# Patient Record
Sex: MALE | Race: WHITE | Employment: FULL TIME | URBAN - METROPOLITAN AREA
[De-identification: names, ages, dates, MRNs, and addresses within clinical notes are randomized per-mention and may not be internally consistent; named-entity substitution may affect disease eponyms.]

---

## 2018-10-31 ENCOUNTER — AMBULATORY - HEALTHEAST (OUTPATIENT)
Dept: FAMILY MEDICINE | Facility: CLINIC | Age: 38
End: 2018-10-31

## 2018-11-02 ENCOUNTER — OFFICE VISIT - HEALTHEAST (OUTPATIENT)
Dept: FAMILY MEDICINE | Facility: CLINIC | Age: 38
End: 2018-11-02

## 2018-11-02 ENCOUNTER — COMMUNICATION - HEALTHEAST (OUTPATIENT)
Dept: FAMILY MEDICINE | Facility: CLINIC | Age: 38
End: 2018-11-02

## 2018-11-02 DIAGNOSIS — Z00.00 HEALTHCARE MAINTENANCE: ICD-10-CM

## 2018-11-02 DIAGNOSIS — E66.813 CLASS 3 SEVERE OBESITY WITHOUT SERIOUS COMORBIDITY WITH BODY MASS INDEX (BMI) OF 40.0 TO 44.9 IN ADULT, UNSPECIFIED OBESITY TYPE (H): ICD-10-CM

## 2018-11-02 DIAGNOSIS — F43.10 PTSD (POST-TRAUMATIC STRESS DISORDER): ICD-10-CM

## 2018-11-02 DIAGNOSIS — E66.01 CLASS 3 SEVERE OBESITY WITHOUT SERIOUS COMORBIDITY WITH BODY MASS INDEX (BMI) OF 40.0 TO 44.9 IN ADULT, UNSPECIFIED OBESITY TYPE (H): ICD-10-CM

## 2018-11-02 DIAGNOSIS — F41.9 ANXIETY: ICD-10-CM

## 2018-11-02 DIAGNOSIS — G47.30 SLEEP APNEA: ICD-10-CM

## 2018-11-02 DIAGNOSIS — M25.551 HIP PAIN, RIGHT: ICD-10-CM

## 2018-11-02 DIAGNOSIS — Z72.0 TOBACCO ABUSE: ICD-10-CM

## 2018-11-02 ASSESSMENT — MIFFLIN-ST. JEOR: SCORE: 2297.92

## 2019-03-18 ENCOUNTER — OFFICE VISIT - HEALTHEAST (OUTPATIENT)
Dept: SLEEP MEDICINE | Facility: CLINIC | Age: 39
End: 2019-03-18

## 2019-03-18 DIAGNOSIS — R06.83 SNORING: ICD-10-CM

## 2019-03-18 DIAGNOSIS — E66.01 MORBID OBESITY (H): ICD-10-CM

## 2019-03-18 DIAGNOSIS — G47.10 HYPERSOMNIA: ICD-10-CM

## 2019-03-18 DIAGNOSIS — R29.818 SUSPECTED SLEEP APNEA: ICD-10-CM

## 2019-03-18 ASSESSMENT — MIFFLIN-ST. JEOR: SCORE: 2297.92

## 2019-04-03 ENCOUNTER — RECORDS - HEALTHEAST (OUTPATIENT)
Dept: ADMINISTRATIVE | Facility: OTHER | Age: 39
End: 2019-04-03

## 2019-04-09 ENCOUNTER — OFFICE VISIT - HEALTHEAST (OUTPATIENT)
Dept: BEHAVIORAL HEALTH | Facility: HOSPITAL | Age: 39
End: 2019-04-09

## 2019-04-09 DIAGNOSIS — F43.10 PTSD (POST-TRAUMATIC STRESS DISORDER): ICD-10-CM

## 2019-04-10 ENCOUNTER — COMMUNICATION - HEALTHEAST (OUTPATIENT)
Dept: BEHAVIORAL HEALTH | Facility: CLINIC | Age: 39
End: 2019-04-10

## 2019-04-23 ENCOUNTER — OFFICE VISIT - HEALTHEAST (OUTPATIENT)
Dept: BEHAVIORAL HEALTH | Facility: HOSPITAL | Age: 39
End: 2019-04-23

## 2019-04-23 DIAGNOSIS — F43.10 PTSD (POST-TRAUMATIC STRESS DISORDER): ICD-10-CM

## 2019-04-30 ENCOUNTER — OFFICE VISIT - HEALTHEAST (OUTPATIENT)
Dept: BEHAVIORAL HEALTH | Facility: HOSPITAL | Age: 39
End: 2019-04-30

## 2019-04-30 DIAGNOSIS — F43.10 PTSD (POST-TRAUMATIC STRESS DISORDER): ICD-10-CM

## 2019-05-07 ENCOUNTER — OFFICE VISIT - HEALTHEAST (OUTPATIENT)
Dept: BEHAVIORAL HEALTH | Facility: HOSPITAL | Age: 39
End: 2019-05-07

## 2019-05-07 ENCOUNTER — AMBULATORY - HEALTHEAST (OUTPATIENT)
Dept: BEHAVIORAL HEALTH | Facility: CLINIC | Age: 39
End: 2019-05-07

## 2019-05-07 DIAGNOSIS — F33.1 MAJOR DEPRESSIVE DISORDER, RECURRENT EPISODE, MODERATE (H): ICD-10-CM

## 2019-05-07 DIAGNOSIS — F43.10 PTSD (POST-TRAUMATIC STRESS DISORDER): ICD-10-CM

## 2019-05-07 DIAGNOSIS — F41.1 GAD (GENERALIZED ANXIETY DISORDER): ICD-10-CM

## 2019-05-13 ENCOUNTER — OFFICE VISIT - HEALTHEAST (OUTPATIENT)
Dept: BEHAVIORAL HEALTH | Facility: CLINIC | Age: 39
End: 2019-05-13

## 2019-05-13 DIAGNOSIS — F43.10 PTSD (POST-TRAUMATIC STRESS DISORDER): ICD-10-CM

## 2019-05-13 ASSESSMENT — MIFFLIN-ST. JEOR: SCORE: 2270.71

## 2019-07-08 ENCOUNTER — COMMUNICATION - HEALTHEAST (OUTPATIENT)
Dept: BEHAVIORAL HEALTH | Facility: CLINIC | Age: 39
End: 2019-07-08

## 2019-12-28 ENCOUNTER — COMMUNICATION - HEALTHEAST (OUTPATIENT)
Dept: BEHAVIORAL HEALTH | Facility: CLINIC | Age: 39
End: 2019-12-28

## 2019-12-28 DIAGNOSIS — F43.10 PTSD (POST-TRAUMATIC STRESS DISORDER): ICD-10-CM

## 2020-01-22 ENCOUNTER — COMMUNICATION - HEALTHEAST (OUTPATIENT)
Dept: BEHAVIORAL HEALTH | Facility: CLINIC | Age: 40
End: 2020-01-22

## 2020-01-22 DIAGNOSIS — F43.10 PTSD (POST-TRAUMATIC STRESS DISORDER): ICD-10-CM

## 2020-03-11 ENCOUNTER — HEALTH MAINTENANCE LETTER (OUTPATIENT)
Age: 40
End: 2020-03-11

## 2020-03-16 ENCOUNTER — NURSE TRIAGE (OUTPATIENT)
Dept: NURSING | Facility: CLINIC | Age: 40
End: 2020-03-16

## 2020-03-17 NOTE — TELEPHONE ENCOUNTER
Wife completed oncare today and was advised to be tested for covid. Wife was tested today - patient was at employer and was in 2 different homes today - is in many people's homes. Per protocol, wife was informed that her entire family should be self-quarantined until the results come back. Patient informed [employer] of this - his supervisor said he needs to report to work until test comes back negative. Gave patient the [facility] phone number and advised he have his supervisor discuss this with the department of health.    Afshan Mosher RN on 3/16/2020 at 8:18 PM    Reason for Disposition    Health Information question, no triage required and triager able to answer question    Additional Information    Negative: [1] Caller is not with the adult (patient) AND [2] reporting urgent symptoms    Negative: Lab result questions    Negative: Medication questions    Negative: Caller can't be reached by phone    Negative: Caller has already spoken to PCP or another triager    Negative: RN needs further essential information from caller in order to complete triage    Negative: Requesting regular office appointment    Negative: [1] Caller requesting NON-URGENT health information AND [2] PCP's office is the best resource    Protocols used: INFORMATION ONLY CALL-A-

## 2020-10-05 ENCOUNTER — TRANSFERRED RECORDS (OUTPATIENT)
Dept: HEALTH INFORMATION MANAGEMENT | Facility: CLINIC | Age: 40
End: 2020-10-05

## 2020-11-05 ENCOUNTER — TELEPHONE (OUTPATIENT)
Dept: ORTHOPEDICS | Facility: OTHER | Age: 40
End: 2020-11-05

## 2020-11-05 NOTE — TELEPHONE ENCOUNTER
Reason for Call:  Other appointment    Detailed comments: left message to call back and schedule appt with [facility]    Phone Number Patient can be reached at: Home number on file [phone] (home)    Best Time: anytime    Can we leave a detailed message on this number? YES    Call taken on 11/5/2020 at 8:29 AM by Simona Burgess

## 2020-11-10 NOTE — TELEPHONE ENCOUNTER
[facility] for [facility] appointment walked to [facility] radiology to be entered into Epic. Report sent to scanning with a copy in upcoming [place] folder. Aimee Liu CMA, November 10, 2020

## 2021-01-04 ENCOUNTER — HEALTH MAINTENANCE LETTER (OUTPATIENT)
Age: 41
End: 2021-01-04

## 2021-02-10 ENCOUNTER — OFFICE VISIT (OUTPATIENT)
Dept: ORTHOPEDICS | Facility: CLINIC | Age: 41
End: 2021-02-10
Payer: COMMERCIAL

## 2021-02-10 ENCOUNTER — ANCILLARY PROCEDURE (OUTPATIENT)
Dept: GENERAL RADIOLOGY | Facility: CLINIC | Age: 41
End: 2021-02-10
Attending: ORTHOPAEDIC SURGERY
Payer: COMMERCIAL

## 2021-02-10 VITALS
HEIGHT: 67 IN | DIASTOLIC BLOOD PRESSURE: 86 MMHG | BODY MASS INDEX: 49.44 KG/M2 | WEIGHT: 315 LBS | SYSTOLIC BLOOD PRESSURE: 120 MMHG

## 2021-02-10 DIAGNOSIS — M25.552 BILATERAL HIP PAIN: Primary | ICD-10-CM

## 2021-02-10 DIAGNOSIS — E66.01 MORBID OBESITY (H): ICD-10-CM

## 2021-02-10 DIAGNOSIS — M25.551 BILATERAL HIP PAIN: Primary | ICD-10-CM

## 2021-02-10 DIAGNOSIS — M25.552 BILATERAL HIP PAIN: ICD-10-CM

## 2021-02-10 DIAGNOSIS — M25.551 BILATERAL HIP PAIN: ICD-10-CM

## 2021-02-10 DIAGNOSIS — M16.0 PRIMARY OSTEOARTHRITIS OF BOTH HIPS: ICD-10-CM

## 2021-02-10 PROCEDURE — 73522 X-RAY EXAM HIPS BI 3-4 VIEWS: CPT | Mod: TC | Performed by: RADIOLOGY

## 2021-02-10 PROCEDURE — 99203 OFFICE O/P NEW LOW 30 MIN: CPT | Performed by: ORTHOPAEDIC SURGERY

## 2021-02-10 RX ORDER — DICLOFENAC SODIUM 75 MG/1
75 TABLET, DELAYED RELEASE ORAL 2 TIMES DAILY
Qty: 30 TABLET | Refills: 0 | Status: SHIPPED | OUTPATIENT
Start: 2021-02-10

## 2021-02-10 SDOH — HEALTH STABILITY: MENTAL HEALTH: HOW MANY STANDARD DRINKS CONTAINING ALCOHOL DO YOU HAVE ON A TYPICAL DAY?: NOT ASKED

## 2021-02-10 SDOH — HEALTH STABILITY: MENTAL HEALTH: HOW OFTEN DO YOU HAVE 6 OR MORE DRINKS ON ONE OCCASION?: NOT ASKED

## 2021-02-10 SDOH — HEALTH STABILITY: MENTAL HEALTH: HOW OFTEN DO YOU HAVE A DRINK CONTAINING ALCOHOL?: NOT ASKED

## 2021-02-10 ASSESSMENT — MIFFLIN-ST. JEOR: SCORE: 2324.56

## 2021-02-10 ASSESSMENT — PAIN SCALES - GENERAL: PAINLEVEL: EXTREME PAIN (8)

## 2021-02-10 NOTE — LETTER
2/10/2021         RE: Anjum Oglesby  [address]  [place] [address]        Dear Colleague,    Thank you for referring your patient, Anjum Oglesby, to the [facility]. Please see a copy of my visit note below.    ORTHOPEDIC CONSULT      Chief Complaint: Anjum Oglesby is a 40 year old male who is being seen for   Chief Complaint   Patient presents with     Musculoskeletal Problem     bilateral hip pain     Consult     [facility]       History of Present Illness:   Anjum Oglesby is a 40 year old male who is seen in consultation at [facility] request of [facility] for [facility] of bilateral hip pain.    Mechanism of Injury: No trauma or inciting event.  Location: bilateral groin pain   Duration of Pain: Approximately 15 years  Rating of Pain:  severe.    Pain Quality: Achy but can be sharp  Pain is better with: Decreased activity or rest, ibuprofen Aleve and Tylenol also helps some.  Not doing heavy range of motion also.  Pain is worse with:  Range of motion of the hip, increased activity or ambulation.  Treatment so far consists of: He has tried ibuprofen, Aleve, Tylenol, decreased activity, rest.  Patient has not tried any injections or formal therapy.  Associated Features: Patient denies any numbness or tingling or radicular type pain.  Prior history of related problems:   Patient has no history of previous injury or trauma to the hips.  Patient has no history of previous surgery of the hips or any surgery.  Patient has no history of dysplasia of the hips in the past, patient has no history of using steroids for a long term or even short-term.  Pain is limiting work. Pain is limiting normal activities of daily living. The patient has pain at rest.  Patient enjoys his job and is worried about losing his job because of his inability to do certain activities because of his hips.  Here for Orthopedic consultation.  The pain is getting better.  Additional information: In  "2006 patient was in the  and for started having groin pain.  In 2070 got worse.  In about 2010 the [facility].  [facility] has [facility] and now finally has talked to the right people and is getting somewhere on getting his hips taken care of.  Patient also has tried losing weight he was 350 pounds and now he is 319 pounds and he has done this with diet and portion control.        Patient's past medical, surgical, social and family histories reviewed.     No past medical history on file.    No past surgical history on file.    Medications:  No current outpatient medications on file prior to visit.  No current facility-administered medications on file prior to visit.       Allergies   Allergen Reactions     Penicillins        Social History     Occupational History     Not on file   Tobacco Use     Smoking status: Never Smoker     Smokeless tobacco: Never Used   Substance and Sexual Activity     Alcohol use: Yes     Drug use: Yes     Types: Marijuana     Sexual activity: Not on file       No family history on file.    REVIEW OF SYSTEMS  10 point review systems performed otherwise negative as noted as per history of present illness.    Physical Exam:  Vitals: /86   Ht 1.708 m (5' 7.25\")   Wt 144.7 kg (319 lb)   BMI 49.59 kg/m    BMI= Body mass index is 49.59 kg/m .  Constitutional: healthy, alert and no acute distress   Psychiatric: mentation appears normal and affect normal/bright  NEURO: no focal deficits  RESP: Normal with easy respirations and no use of accessory muscles to breathe, no audible wheezing or retractions  CV: No peripheral edema         Calves soft and nontender to palpation.  SKIN: No erythema, rashes, excoriation, or breakdown. No evidence of infection of the legs I did not view the hips today.  Patient was in shorts.  JOINT/EXTREMITIES:bilateral Hip   Exam: Palpation: Tender:   None  Non-tender:  left greater trochanter, right greater trochanter, left " proximal hamstring attachment, right proximal hamstring attachment, bilateral thighs and knees and legs also nontender.  Range of Motion:    Left Hip  Flexion to 35 degrees, internal rotation to 10 degrees, external rotation to 5 degrees.  Range of motion without catching or locking but the patient has pain at all directions.   Right Hip Flexion to 45 degrees, internal rotation to 15 degrees, external rotation to 10 degrees.  Range of motion without catching or locking but pain in all directions  Strength:  5 out of 5 hip flexion, quad and hamstring bilaterally  Special tests: Unable to do Dung's maneuver, unable to do FADIR maneuver, unable to do scour maneuver, unable to do Avinash's test.     SPECIAL TEST SPINE: Patient has no tenderness on palpation of cervical/thoracic/lumbar spine.     Lymph: None  GAIT: Not observed today    Diagnostic Modalities:  bilateral hip X-ray: Advanced degenerative changes bilateral with bone-on-bone arthritis osteophyte formation.  No fractures or dislocations  Independent visualization of the images was performed.      Impression: bilateral hip primary osteoarthritis in a 40-year-old male with a BMI of 50    Plan:  All of the above pertinent physical exam and imaging modalities findings was reviewed with Anjum.    We reviewed his radiographs.  Extensive degenerative changes for a young karon.  We discussed these findings.  Recommend conservative care.  Recommend physical therapy.  Also provided him prescription of Voltaren.  We also recommended and ordered radiology guided intra-articular hip steroid injections.  I did review the limitations of the injections.  We also discussed weight loss.  He has recently lost some weight.  I reviewed with him BMI status.  His BMI would need to be less than 40 before considering hip replacement surgery.  He understands that.        Return to clinic 4-6 , week(s), PRN, or sooner as needed for changes.  Re-x-ray on return: Jo Kemp  D.O.      Again, thank you for allowing me to participate in the care of your patient.        Sincerely,        Praneeth Kemp, DO

## 2021-02-10 NOTE — PROGRESS NOTES
ORTHOPEDIC CONSULT      Chief Complaint: Anjum Oglesby is a 40 year old male who is being seen for   Chief Complaint   Patient presents with     Musculoskeletal Problem     bilateral hip pain     [facility]       History of Present Illness:   Anjum Oglesby is a 40 year old male who is seen in consultation at the request of [facility] for evaluation of bilateral hip pain.    Mechanism of Injury: No trauma or inciting event.  Location: bilateral groin pain   Duration of Pain: Approximately 15 years  Rating of Pain:  severe.    Pain Quality: Achy but can be sharp  Pain is better with: Decreased activity or rest, ibuprofen Aleve and Tylenol also helps some.  Not doing heavy range of motion also.  Pain is worse with:  Range of motion of the hip, increased activity or ambulation.  Treatment so far consists of: He has tried ibuprofen, Aleve, Tylenol, decreased activity, rest.  Patient has not tried any injections or formal therapy.  Associated Features: Patient denies any numbness or tingling or radicular type pain.  Prior history of related problems:   Patient has no history of previous injury or trauma to the hips.  Patient has no history of previous surgery of the hips or any surgery.  Patient has no history of dysplasia of the hips in the past, patient has no history of using steroids for a long term or even short-term.  Pain is limiting work. Pain is limiting normal activities of daily living. The patient has pain at rest.  Patient enjoys his job and is worried about losing his job because of his inability to do certain activities because of his hips.  Here for Orthopedic consultation.  The pain is getting better.  Additional information: In 2006 patient was in the  and for started having groin pain.  In 2070 got worse.  In about 2010 the [facility].  He has had difficulty with the [facility] and now finally has talked to the right people and is getting somewhere on getting his hips  "taken care of.  Patient also has tried losing weight he was 350 pounds and now he is 319 pounds and he has done this with diet and portion control.        Patient's past medical, surgical, social and family histories reviewed.     No past medical history on file.    No past surgical history on file.    Medications:  No current outpatient medications on file prior to visit.  No current facility-administered medications on file prior to visit.       Allergies   Allergen Reactions     Penicillins        Social History     Occupational History     Not on file   Tobacco Use     Smoking status: Never Smoker     Smokeless tobacco: Never Used   Substance and Sexual Activity     Alcohol use: Yes     Drug use: Yes     Types: Marijuana     Sexual activity: Not on file       No family history on file.    REVIEW OF SYSTEMS  10 point review systems performed otherwise negative as noted as per history of present illness.    Physical Exam:  Vitals: /86   Ht 1.708 m (5' 7.25\")   Wt 144.7 kg (319 lb)   BMI 49.59 kg/m    BMI= Body mass index is 49.59 kg/m .  Constitutional: healthy, alert and no acute distress   Psychiatric: mentation appears normal and affect normal/bright  NEURO: no focal deficits  RESP: Normal with easy respirations and no use of accessory muscles to breathe, no audible wheezing or retractions  CV: No peripheral edema         Calves soft and nontender to palpation.  SKIN: No erythema, rashes, excoriation, or breakdown. No evidence of infection of the legs I did not view the hips today.  Patient was in shorts.  JOINT/EXTREMITIES:bilateral Hip   Exam: Palpation: Tender:   None  Non-tender:  left greater trochanter, right greater trochanter, left proximal hamstring attachment, right proximal hamstring attachment, bilateral thighs and knees and legs also nontender.  Range of Motion:    Left Hip  Flexion to 35 degrees, internal rotation to 10 degrees, external rotation to 5 degrees.  Range of motion without " catching or locking but the patient has pain at all directions.   Right Hip Flexion to 45 degrees, internal rotation to 15 degrees, external rotation to 10 degrees.  Range of motion without catching or locking but pain in all directions  Strength:  5 out of 5 hip flexion, quad and hamstring bilaterally  Special tests: Unable to do Dung's maneuver, unable to do FADIR maneuver, unable to do scour maneuver, unable to do Avinash's test.     SPECIAL TEST SPINE: Patient has no tenderness on palpation of cervical/thoracic/lumbar spine.     Lymph: None  GAIT: Not observed today    Diagnostic Modalities:  bilateral hip X-ray: Advanced degenerative changes bilateral with bone-on-bone arthritis osteophyte formation.  No fractures or dislocations  Independent visualization of the images was performed.      Impression: bilateral hip primary osteoarthritis in a 40-year-old male with a BMI of 50    Plan:  All of the above pertinent physical exam and imaging modalities findings was reviewed with Anjum.    We reviewed his radiographs.  Extensive degenerative changes for a young karon.  We discussed these findings.  Recommend conservative care.  Recommend physical therapy.  Also provided him prescription of Voltaren.  We also recommended and ordered radiology guided intra-articular hip steroid injections.  I did review the limitations of the injections.  We also discussed weight loss.  He has recently lost some weight.  I reviewed with him BMI status.  His BMI would need to be less than 40 before considering hip replacement surgery.  He understands that.        Return to clinic 4-6 , week(s), PRN, or sooner as needed for changes.  Re-x-ray on return: Jo Kemp D.O.

## 2021-04-25 ENCOUNTER — HEALTH MAINTENANCE LETTER (OUTPATIENT)
Age: 41
End: 2021-04-25

## 2021-05-27 NOTE — PROGRESS NOTES
Mental Health Visit Note    04/09/2019   Start time: 401    Stop Time: 507   Session # 1    Session Type: Patient is presenting for an Individual session.    Anjum Oglesby is a 38 y.o. male is being seen today for    Chief Complaint   Patient presents with     Intake     PTSD     New symptoms or complaints: Patient indicated struggling with nightmares, irritability and being in crowds.     Functional Impairment:   Personal: 4  Family: 3  Work: 3  Social:4    Clinical assessment of mental status: Anjum Oglesby presented on time for his scheduled session today.  He was open and cooperative, and dressed appropriately for this session and weather. His memory was fair for short and long term.  His speech and language was elevated throughout the session.  Concentration and focus is within normal. Psychosis is not noted or reported. He reports his mood is anxious.  Affect is congruent with speech.  Fund of knowledge is adequate. Insight is adequate for therapy.     Suicidal/Homicidal Ideation present: None Reported This Session    Patient's impression of their current status: Patient reported feeling irritable. Patient indicated coming to therapy due to his anger at this time. Patient reported he also is not sleeping due to nightmares. Patient indicated [place] and then coming back to [facility] and not being the same. Patient reported over the years the symptoms have continued to get worse and worse. Patient indicated they are now starting to affect his family which is why he knows the importance of getting therapy at this time.     Therapist impression of patients current state: The patient presented for an initial intake session with this provider. Patient is a 38 year old male. Patient was referred by Dr. Augustine to engage in individual psychotherapy to address symptoms of nightmares, anxiety and irritability. The patient appeared cooperative and open-minded throughout the  intake and easily engaged in dialogue. Therapist and patient reviewed consent and privacy policy. Patient reported understanding and signed document- sent to be scanned into EMR. The patient has not engaged in outpatient psychotherapy services in the community so there is no diagnostic assessment on file or available. The patient completed the following questionnaires for session today:  PHQ-9, WILMER-7 Therapist and patient completed C-SSRS together in session. No concerns about patient's safety or the safety of others per assessment today. .  These questionnaires will be used to inform diagnoses and will be uploaded to patient chart after standard DA is completed. Therapist and patient will further review patient's history during the next follow-up encounter in order to complete a standard diagnostic assessment. Goals for treatment will be established after the 2nd or 3rd follow-up session with this provider. The patient plans to follow-up with psychotropic medication management with her PCP. Patient did request psychiatry services at intake.    Type of psychotherapeutic technique provided: Insight oriented, Client centered and CBT    Progress toward short term goals:Progress as expected, with patient coming to session.    Review of long term goals: Not done at today's visit due to first visit    Diagnosis:   1. PTSD (post-traumatic stress disorder)        Plan and Follow up: Patient will return in two weeks for scheduled session. Patient would benefit from continuing to identify ways his mental health has affected his life. Patient should use grounding to help with thoughts. Patient should fill out paperwork provided by therapist.    Discharge Criteria/Planning: Patient will continue with follow-up until therapy can be discontinued without return of signs and symptoms.    Maru Backer 4/10/2019

## 2021-05-28 NOTE — PROGRESS NOTES
Outpatient Mental Health Treatment Plan    Name:  Anjum Oglesby  :  1980  MRN:  [id number]    Treatment Plan:  Initial Treatment Plan  Intake/initial treatment plan date:  2019  Benefit and risks and alternatives have been discussed: Yes  Is this treatment appropriate with minimal intrusion/restrictions: Yes  Estimated duration of treatment:  Ongoing therapy at this time  Anticipated frequency of services:  Weekly  Necessity for frequency: This frequency is needed to establish therapeutic goals and for continuity of care in order to monitor progress.  Necessity for treatment: To address cognitive, behavioral, and/or emotional barriers in order to work toward goals and to improve quality of life.    Session Type: Patient is presenting for an Individual session.    Plan:           ?   ? Anxiety    Goal:  Decrease average anxiety level from 4 to 1.   Strategies: ? [x]Learn and practice relaxation techniques and other coping strategies (e.g., thought stopping, reframing, meditation)     ? [x] Increase involvement in meaningful activities     ? [x] Discuss sleep hygiene     ? [x] Explore thoughts and expectations about self and others     ? [x] Identify and monitor triggers for panic/anxiety symptoms     ? [x] Implement physical activity routine (with physician approval)     ? [x] Consider introduction of bibliotherapy and/or videos     ? [x] Continue compliance with medical treatment plan (or explore barriers)       ?Degree to which this is a problem: 4  Degree to which goal is met: 1    Date of next review: 2019       ? Depression    Goal:  Decrease average depression level from 4 to 1.   Strategies:    ?[x] Decrease social isolation     [x] Increase involvement in meaningful activities     ?[x] Discuss sleep hygiene     ?[x] Explore thoughts and expectations about self and others     ?[x] Process grief (loss of significant person, independence, role, etc.)     ?[x] Assess for suicide  risk     ?[x] Implement physical activity routine (with physician approval)     [x] Consider introduction of bibliotherapy and/or videos     [x] Continue compliance with medical treatment plan (or explore barriers)       ?  Degree to which this is a problem: 3  Degree to which goal is met: 1    Date of next review: 08/07/2019    PTSD    Goal: Identify triggers, separate the traumatic memory from the debilitating emotion associated with it.    Strategies:    [X]Learn and practice relaxation techniques and other coping strategies (e.g., thought stopping, reframing, meditation)    ? [X] Cognitive restructuring    ? [X] Discuss sleep hygiene    ? [X] Identify and change maladaptive coping behaviors    ? [X] Prolonged exposure therapy    ? [X] Identify cues and symptoms of PTSD      Degree to which this is a problem (1-4): 4  Degree to which goal is met (1-4): 1    Date of next review: 08/07/2019    Functional Impairment:  1=Not at all/Rarely  2=Some days  3=Most Days  4=Every Day    Personal : 4  Family : 4  Social : 4   Work/school : 4    Diagnosis:  Major depressive disorder, recurrent, moderate; Generalized Anxiety disorder; PTSD      WHODAS 2.0 12-item version: 58.33%  H1= 30  H2= 0  H3= 30    Clinical assessments and measures completed:. WILMER-7, PHQ-9 and CAGE-AID, [place]     Strengths:  Patient indicated his strengths includes hard working.      Weakness:   Patient reported weakness includes hard working.     Cultural Considerations: Patient is a 38 year old  [place] male.     Persons responsible for this plan: Patient, Provider and Other: Dr. Augustine            Psychotherapist Signature           Patient Signature:              Guardian Signature             Provider: Performed and documented by HALEY Mayo   Date:  5/7/2019

## 2021-05-28 NOTE — PROGRESS NOTES
Correct pharmacy verified with patient and confirmed in snapshot? [x] yes []no    Charge captured ? [x] yes  [] no    Medications Phoned  to Pharmacy [] yes [x]no  Name of Pharmacist:  List Medications, including dose, quantity and instructions      Medication Prescriptions given to patient   [] yes  [x] no   List the name of the drug the prescription was written for.       Medications ordered this visit were e-scribed.  Verified by order class [x] yes  [] no  zoloft 100mg, minipress 1mg    Medication changes or discontinuations were communicated to patient's pharmacy: [] yes  [x] no    UA collected [] yes  [x] no    [place] Prescription Monitoring Program Reviewed? [] yes  [x] no     Referrals were made to:  no    Future appointment was made: [x] yes  [] no    Dictation completed at time of chart check: [x] yes  [] no    I have checked the documentation for today s encounters and the above information has been reviewed and completed.

## 2021-05-28 NOTE — PROGRESS NOTES
"Here today via referral from therapist. Patient states he has been \"battling demons for along time\". Depression 4/5. Anxiety 5/5. Concerns about sleep, unable to get to sleep. Previously had taken trazodone for sleep. Admits to having previous thoughts of hurts self or others.  "
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
## 2021-05-28 NOTE — PROGRESS NOTES
Mental Health Visit Note    04/23/2019   Start time: 400    Stop Time: 505   Session # 2    Session Type: Patient is presenting for an Individual session.    Anjum Oglesby is a 38 y.o. male is being seen today for    Chief Complaint   Patient presents with      Follow Up     Trauma     New symptoms or complaints: Patient reported being easily startled.     Functional Impairment:   Personal: 4  Family: 3  Work: 3  Social:4    Clinical assessment of mental status: Anjum Oglesby presented on time for his scheduled session today.  He was open and cooperative, and dressed appropriately for this session and weather. His memory was fair for short and long term.  His speech and language was elevated throughout the session.  Concentration and focus is within normal. Psychosis is not noted or reported. He reports his mood is anxious.  Affect is congruent with speech.  Fund of knowledge is adequate. Insight is adequate for therapy. Reviewed and no changes for this session.     Suicidal/Homicidal Ideation present: None Reported This Session    Patient's impression of their current status: Patient indicated feeling anxious. Patient reported someone dropped something at work and he started yelling out of fear. Patient indicated then he needed 10 minutes to just lay in order to feel he could start working again. Patient reported his anger has been a problem getting out of the . Patient indicated his wife pointed out that he has avoided see his old  friends. Patient reported he is fine with talking to them on the phone but when they are in town he will not make plans to see them. Patient indicated he also has been struggling with sleep due to stress and nightmares.     Therapist impression of patients current state: Patient appears to have fair insight into his mental health. This therapist continued to gather information for the intake. This therapist challenged patient on ways anger has affected  many aspects of his life. This therapist provided by with skills to help reduce his anger. This therapist provided patient with a handout for PTSD and sleep.     Type of psychotherapeutic technique provided: Insight oriented, Client centered and CBT    Progress toward short term goals:Progress as expected, with patient coming to session.    Review of long term goals: Not done at today's visit due to second visit    Diagnosis:   1. PTSD (post-traumatic stress disorder)        Plan and Follow up: Patient will return in one week for scheduled session. Patient would benefit from continuing to identify ways his mental health has affected his life. Patient should use grounding to help with thoughts. Patient should fill out paperwork provided by therapist.Patient should use skills in the handout to help with sleep. Patient would benefit from trying the skills to reduce anger.   Discharge Criteria/Planning: Patient will continue with follow-up until therapy can be discontinued without return of signs and symptoms.    Maru Bryant 4/24/2019

## 2021-05-28 NOTE — PROGRESS NOTES
Initial Psychotherapy Diagnostic Assessment     [x] Standard  [] Updated    Date(s): 2019  Start Time: 801  Stop Time: 859    Patient Name:  Anjum Oglesby  Age: 38 y.o.   :  1980  MRN:  [id number]    Session Type: Patient is presenting for an Individual session.       Present for session: Patient and therapist    Reason for Referral:  Mr. Oglesby is a 38 y.o. year-old male who was referred on 2019 by Dr. Floyd for an evaluation of cognitive, behavioral, and emotional functioning.  The patient was made aware of the role of psychology service in the patient's care, risks and benefits, and the limits of confidentiality.  The patient agreed to proceed.         Persons Present: Patient and therapist    Presenting Problem/History:  Patient reported coming to therapy due to struggling with his PTSD symptoms including stress getting worse. Patient indicated anger has also became an issue and has affected his relationships. Patient reported serving in the  from  to . 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 Patient indicated his symptoms have continued to get worse and worse. Patient reported before the indicated on the ship he never experienced any of the PTSD symptoms or mental health symptoms that he is experiencing now.    Patient s expectation for treatment (patient stated initial goal; i.e.: I want to let go of my worries , Medication treatment if indicated):  Patient indicated he doesn't want to feel so anxious everyday. Patient reported he wants to be a better  and father.          Functional Impairments: (subjective- 0 is no issues and 4 is extreme issues)  Personal: 4  Family: 4  Work: 3  Social:4     How does the presenting problem affect patients daily functioning:     Patient reported his symptoms affect him all the  time. Patient indicated he never knows when he is going to get angry out of no where or what will trigger his anxiety to get worse. Patient reported knowing his symptoms can affect his work and his family life.     Issues/Stressors:   Patient reported his biggest stressor includes money. Patient indicated other stressors includes his wife in school, moving driving and everyday stress.     Physical Problems: Headaches, Weight gain, Inability to sleep, Sleeping too much, Decreased energy, Decreased appitite and Increased appitite    Social Problems: Communications problems, Distrust of others, No close friends, Need for excessive advice , Decreased social activity and Loss of interest in activities      Behavioral Problems: Binge eating and Temper outbursts      Cognitive Problems: Distractability, Poor memory, Forgetful, Disordered thinking, Racing thoughts, Bothersome thoughts, Recurrent bad memories and Worries      Emotional Problems: Anxious, Angry, Rageful, Nervous, Apathetic, Irritable, Emptiness, Restricted emotion, Depressed mood, Mood swings, Feelings of guilt and Lack of self confidence     Onset/Frequency/Duration presenting problem symptoms:    Patient indicated the symptoms started around 2004 with the symptoms getting progressively worse over the years.     How does the patient perceive he problem in relation to how others see his/her problem?    Patient indicated he is able to recognize his symptoms as a problem as well as his wife.     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 Patient indicated he has been  for one year and 9 months to his current wife.     Children:  (sex and ages, any significant issues):  Patient indicated he has a daughter's age 6 and 10.  Patient indicated his relationship with his daughter's is good.  Patient reported he has a child from his first marriage but the mom does  not allow him to have a relationship with the child.    Parents:  (ages, living or , how many years ):  Patient reported his parents were  when he was young. Patient indicated his dad  at the age of 53 due to colon cancer. Patient reported his mom  7 years later from health issues.    Siblings:  (birth order, ages, significant issues):  Patient indicated he has a half sister who he does not have a relationship with due to conflict from the past. Patient reported he had a brother who  from OD less then a year after his mom passed away.     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     Developmental factors:  (developmental milestones, head injuries, CVA s, etc. that may have impeded milestones):  Patient indicated meeting all developmental milestones. Patient denied any head injuries.     Significant personal relationships including patient s evaluation of the relationship quality:  (Co-worker s, neighbor s, AA groups, Yazidism peers, etc.):   Patient reported his support network includes his wife and kids.     Sexual/physical/emotional/financial abuse/traumatic event:  (any child protection involvement; who reported, Impact on patient/family/other):   Patient indicated a trauma happening when he was in the  on the ship. Patient did not go into details about the trauma due to the struggles of talking about the trauma.       Contextual Non-personal factors contributing to the patients concerns:  (divorce in family, nation/natural disasters):  Patient did not identify any contextual non-personal factors contributing to his presenting concerns.     Strengths/personal resources:  (what does the patient do well, what is going well in life, positive personality characteristics):  Patient indicated his strengths includes hard working.     Weakness:   Patient reported weakness includes hard working.     Belief system:    Patient reported a Synagogue  belief system.     Cultural influences and impact on patient:  (ask about all aspects of culture and ask which are relevant to the patient. Go beyond nationality and ethnicity. Consider biases, life style, community style, i.e.: urban, poverty, abuse, etc). see page 5 Diagnostic Assessment, Clinical Training for descriptors):  Patient is a 38 year old [place]  male. Patient indicated he grew up with his dad instilling to worry about himself and not what other people think. Patient reported his dad wanted him to be his own individual person. Patient indicated his dad reinforced that time is money and money is time so no messing around. Patient reported his mom taught him about the importance of compassion.     Childhood:  Patient reported growing up his whole life with  parents. Patient indicated he had a close relationship with both is parents. Patient reported his father was the strict authoritative parent. Patient indicated he grew up a majority of his life with his mom but did live with his dad later on.     Cultural impact on health and health care:  (how does patient s culture influence how the patient receives health care): The patient does not report cultural factors impacting pursuit of care.  The patient pursues standard medical care.  Patient indicated he uses Western medicine by going to the doctor and taking medications as prescribed.     Current living situation:  (Household members, housing status, stability, multiple moves, potential eviction):  Patient reported he currently lives in an apartment with his wife and two daughters. Patient indicated he feels safe in the apartment but is planning to move soon.     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 Patient reported work is challenging due to not liking people close to him or loud noises.     Financial Concerns:  (basic status, housing, food, clothing are they  on any assistance including SSI/SSDI):   Patient reported he is always worried about money. Patient indicated his needs are met including food, clothing and shelter.     Legal Problems:  (DUI S, divorce, law suits, etc.):  (optional)  Patient indicated getting charged with 5th degree domestic assault from his wife in 2016.     Patient Medical History  Mr. Copeland medical history is significant for   Past Medical History:   Diagnosis Date     Colon polyp      Prediabetes      Current Medications:  Current Outpatient Medications   Medication Sig Note     ibuprofen (ADVIL,MOTRIN) 800 MG tablet Take 800 mg by mouth daily.      triamcinolone (KENALOG) 0.1 % cream Apply topically. 10/31/2018: Received from: [facility] Received Sig: Apply  topically two times a day. Avoid use on face armpits and groin.       Past Mental Health History:    Previous mental health diagnosis:  Patient did not identify any previous mental health diagnosis but did indicate symptoms started around 2004.    Hx of Mental Health Treatment or Services:  Patient indicated before 2016 he [facility] but [facility] could not diagnosis. Patient reported going back in 2016 for 12 step domestic class and counseling.     Hx of MH Tx/Hospitalizations:  (When/Where: must include a review of patient s record.  If not available, why, what if anything are you doing to obtain a record?):   Patient denied any hospitalizations due to mental health.     Hx of Psychiatric Medications:    Patient did not indicate any psychiatric medications.     Self Report Measures:    On the Patient Health Questionnaire-9, a self report measure of depressive symptomatology, he obtained a score of 17, placing him in the range of moderate depression.      On the Generalized Anxiety Disorder-7, a self-report measure of anxiety, he obtained a score of 19,  placing him in the range of severe anxiety.      Suicidal/Homicidal Risk  Assessment:    Suicidal: None reported  Ideation:Patient reported at times having thoughts of hurting himself but having no plan. Patient indicated he could never hurt himself due to his children.   History of Past Attempt(s): description: None reported  Crisis Plan: Patient agreed to call 911 and/or report to ER if suicidal ideations were to occur.     Homicidal: None reported   Ideation:None reported  History of Aggression towards others: None reported  Crisis Plan: Not needed at this time    [place] Suicide Severity Risk Screen:  Positive. Patient denied current suicidal ideations, plans and/or means.     History of destruction to property:  Description: None reported  Crisis Plan: Not needed at this time.       Family Mental Health/Medical History    Family Mental Health:    Patient indicated being unaware of any mental health in his family due to it not being talked about.     Family history of Suicide:  Patient denied any suicides in his family.     Family history Chemical Dependency:    Patient reported his brother and half sister struggled with addiction.     Family Medical history: Family medical history is significant for:   Family History   Problem Relation Age of Onset     Stroke Mother         hemorrhagic stroke     Colon cancer Father          at 53     Drug abuse Brother         heroid OD     Breast cancer Maternal Grandmother    .    Chemical Use/Abuse History    CAGE-AID (screening to determine a patients use/abuse/dependency):      0/4        Alcohol:   [] None Reported    [x] Yes   [] No  Type:Hard Liquor   Frequency: Ocasionally          Street Drugs:   [x] None Reported    [] Yes   [] No    Prescription Drugs:   [x] None Reported    [] Yes   [] No  Tobacco:   [x] None Reported    [] Yes   [] No  Caffeine:   [] None Reported    [x] Yes   [] No    Currently in a treatment program:   [] Yes   [x] No      History of CD Treatment:      [x] None Reported               MENTAL STATUS  EVALUATION  Grooming: Well groomed  Attire: Appropriate  Age: Appears Stated  Behavior Towards Examiner: Cooperative  Motor Activity: Within normal   Eye Contact: Fair  Mood: Irritable  Affect: Irritable  Speech/Language: Rapid and Loud  Attention: Within normal  Concentration: Within normal  Thought Process: Within normal  Thought Content: Hallucinations: None reported  Delusions: None reported  Orientation: X 3  Memory: No Evidence of Impairment  Judgement: No Evidence of Impairment  Estimated Intelligence: Average  Demonstrated Insight: Adequate  Fund of Knowledge: adequate    Clinical Impressions/Assessment/Recommendations: (Stands alone; is a synopsis of patients story, any impacting family or cultural issue on diagnosis and how patient meets criteria for diagnosis). Can state does not meet full criteria and therefore a provisional diagnosis is given.    The patient is a 38 y.o. year-old,  male.  Patient reported coming to therapy due to struggling with his PTSD symptoms including stress getting worse. Patient indicated anger has also became an issue and has affected his relationships. 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 Patient indicated his symptoms have continued to get worse and worse. Patient reported before the indicated on the ship he never experienced any of the PTSD symptoms or mental health symptoms that he is experiencing now.  Patient indicated he doesn't want to feel so anxious everyday. Patient reported he wants to be a better  and father. Patient reported his symptoms affect him all the time. Patient indicated he never knows when he is going to get angry out of no where or what will trigger his anxiety to get worse. Patient reported knowing his symptoms can affect his work and  his family life. Patient reported his biggest stressor includes money. Patient indicated other stressors includes his wife in school, moving driving and everyday stress.Patient indicated the symptoms started around 2004 with the symptoms getting progressively worse over the years. Patient indicated he is able to recognize his symptoms as a problem as well as his wife.     Patient indicated he grew up with his dad instilling to worry about himself and not what other people think. Patient reported his dad wanted him to be his own individual person. Patient indicated his dad reinforced that time is money and money is time so no messing around. Patient reported his mom taught him about the importance of compassion. Patient reported growing up his whole life with  parents. Patient indicated he had a close relationship with both is parents. Patient reported his father was the strict authoritative parent. Patient indicated he grew up a majority of his life with his mom but did live with his dad later on. Patient indicated his strengths includes hard working. Patient reported weakness includes hard working.      Prioritization of needed mental Health ancillary or other services.   It appears patient would benefit from one on one psychotherapy using a CBT approach to work on reducing symptoms. Patient will also be taught coping skills anf given handouts related to PTSD and symptoms.   It appears patient would also benefit from medication management and a referral will be placed for patient.     How Diagnostic criteria is met: (Include symptoms, frequency, duration, functional impairments).  It appears patient meets DSM-5 criteria for PTSD as evidenced by patient experiencing a traumatic event, nightmares, irritable/angry outburst, feeling the world is a dangerous place, don't trust people, avoid thoughts, feeling behaviors related to trauma, don't like being around people, hypervigilant, isolation, easily startled  and feeling hopeless.   It appears patient meets DSM-5 criteria for major depressive disorder, recurrent, moderate as evidenced by depressed mood, decreased interest in pleasurable activities, difficulty with sleep, feeling of guilt, decreased social activities, changes in appetite, irritable, feeling emotional and lack of self-confidence.  It appears patient meets DSM-5 criteria for WILMER as evidenced by excessive anxiety and worry, difficulty controlling the worry, feeling keyed up or on edge, difficulty sleeping, racing thoughts, distractible and nervousness.     Explanation for any provisional diagnosis. Hypothesis why alternative diagnosis was considered and ruled out.  No rule out's at this time.    Recommendations (treatment, referrals, services needed).  One on one weekly psychotherapy  Referral for medication management       Diagnosis (non-Axial as defined in DSM-5)  PTSD  Major depressive disorder, recurrent, moderate  WILMER    Provisional Diagnosis (list only- no explanation needed)  None      WHODAS 2.0 12-item version: 58.33%  H1= 30  H2= 0  H3= 30    Scores presented in qualifiers to represent level of disability.    NO problem - (none, absent, negligible,  ) - 0-4 %   MILD problem - (slight, low, ) - 5-24 %   MODERATE problem - (medium, fair,...) - 25-49 %   SEVERE problem - (high, extreme,  ) - 50-95 %   COMPLETE problem - (total, ) -  %    Assessment of client resolving presenting mental health concerns:  Ability  [] low     [x] average     [] high  Motivation [] low     [x] average     [] high  Willingness [] low     [x] average     [] high    Sources/references used in completing this assessment:     -Face to face interview  -Patient Chart  -Adult Intake Questionnaire  -Measures completed: WHODAS, C-SSRS, CAGE, PHQ-9, WILMER-7,       Initial Therapy Plan (ex: develop therapeutic relationship with therapist, Refer to psychiatry/psych testing, etc.):    1. Patient and therapist will develop  therapeutic relationship.  2. Patient to present for follow up appointment to initiate psychotherapy services.  3. Patient to continue to follow up with primary care physician as needed and take medications as prescribed.  4. Develop comprehensive treatment plan.   5. Refer to psychiatry services for medication management.       Is patient's family involved in the treatment?  [x] No     [] Yes      If no, Why?  Patient did not indicate wanting family part of therapy at this time.     Therapist s Signature/Supervision/co-signature statement:   HALEY Mayo

## 2021-06-02 VITALS — BODY MASS INDEX: 45.47 KG/M2 | WEIGHT: 307 LBS | HEIGHT: 69 IN

## 2021-06-03 VITALS — WEIGHT: 301 LBS | HEIGHT: 69 IN | BODY MASS INDEX: 44.58 KG/M2

## 2021-06-04 NOTE — TELEPHONE ENCOUNTER
Central Scheduling: Please call patient to make appt with Erik Victoria NP.    Date of Last Office Visit: 5/13/19  Date of Next Office Visit: none scheduled, message left for patient to call scheduling  No shows since last visit: 7/8/19  Cancellations since last visit: 6/17/19  ED visits since last visit:  none  Medication Sertraline 100 mg date last ordered: 5/13/19  Qty: 30  Refills: 5  Lapse in therapy greater than 7 days: no  Medication refill request verified as identical to current order: yes  Result of Last DAM, VPA, Li+ Level, CBC, or Carbamazepine Level (at or since last visit): N/A     [] Medication refilled per [facility] M-1.   [x] Medication unable to be refilled by RN due to criteria not met as indicated below:     []Eligibility - not seen in last year    [x]Supervision - no future appointment, pt has cancellation, and a no show     []Compliance     []Verification - order discrepancy    []Controlled Medication    []Medication not included in RN Protocol    []90 - day supply request    []Other     Current Medication list:    ibuprofen (ADVIL,MOTRIN) 800 MG tablet Take 800 mg by mouth daily.   prazosin (MINIPRESS) 1 MG capsule Take 1 capsule (1 mg total) by mouth at bedtime.   prazosin (MINIPRESS) 2 MG capsule Take 1 capsule (2 mg total) by mouth at bedtime.   sertraline (ZOLOFT) 100 MG tablet Take 1 tablet (100 mg total) by mouth daily.   sertraline (ZOLOFT) 50 MG tablet Take one half tab by mouth daily for 3 days, then start one tablet by mouth every day.   triamcinolone (KENALOG) 0.1 % cream Apply topically.       Medication Plan of Care at last office visit with MD/CNP:    Diagnostic Impression:  1. 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  I reviewed the therapies that help treat prolonged exposure therapy including Prolonged Exposure therapy, EMDR, CBT, and pharmacotherapy options.  Current plan is to continue therapy and take medications to treat this condition:    Start sertraline 50 mg tablets, after 1 month increase to 100 mg daily.    Start prazosin to treat nightmares and elevated autonomic nervous system symptoms.    Continue individual psychotherapy.    Follow-up in 2 months.     2. Major Depressive, recurrent: Client has significant mood symptoms that appear congruent with major depressive disorder.  However, chronic posttraumatic stress disorder may also account for current symptoms.  This is not well differentiated, but I believe he does have an episodic mood disorder-unipolar depression.    Start sertraline 50 mg daily and increase to 100 mg after 1 month.    Continue individual psychotherapy.    Follow-up in 2 months        Client is not currently drinking heavily.  He states having reduced alcohol use since 2016.  He is at elevated risk for heavy alcohol use due to ongoing anxiety and depression and a past history of heavy drinking.  Close evaluation at each contact with client around his alcohol use is important.    [place]  was reviewed, no data received

## 2021-06-21 NOTE — PROGRESS NOTES
Assessment/Plan:     Anjum Oglesby is a 38 y.o. male presenting for:    1. Anxiety  I believe the patient would benefit from meeting with a psychiatrist to discuss medication and potentially therapy.  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  - Ambulatory referral to Psychiatry    2. PTSD (post-traumatic stress disorder)  See above  - Ambulatory referral to Psychiatry    3. Sleep apnea  Concern for sleep apnea.  Referral to the sleep specialist.  - Ambulatory referral to Sleep Medicine    4. Hip pain, right  Referral to orthopedics will be given.  - Ambulatory referral to Orthopedics    5. Healthcare maintenance  He will return fasting.    - Basic Metabolic Panel; Future  - Lipid Cascade; Future  - Insulin, Fasting; Future  - Glycosylated Hemoglobin A1c; Future    6. Tobacco abuse  Discussed smoking cessation.    7. Class 3 severe obesity without serious comorbidity with body mass index (BMI) of 40.0 to 44.9 in adult, unspecified obesity type (H)  The following high BMI interventions were performed this visit: encouragement to exercise and weight monitoring        Medications Discontinued During This Encounter   Medication Reason     ondansetron (ZOFRAN) 4 MG tablet Therapy completed         Chief Complaint:  Chief Complaint   Patient presents with     Establish Care     Looking for PCP.  Degenerative arthritis in both hips. Referral to Orthopedics/Mental health/Sleep Study.          Subjective:     Anjum Oglesby is a 38 y.o. male who is a new patient to our clinic presenting for establishment of care.    There are several issues that the patient wants to discuss today.  The first issue is that since his time in the service he has had issues with PTSD.  He states that he will have a difficult time sleeping sometimes he will feel just generally anxious.  He is fairly certain at this was caused by his time in the service given that he did not have these  "issues prior to going in.  He is unsure if he would want a daily medication for this but believes that he would like to speak with a psychiatrist to see if there is anything that can be done.  He would also be willing to see a therapist if that is what is recommended.    Concern for sleep apnea: The patient does have a concern for sleep apnea given that he snores and it is been reported to him that he \"stops breathing at night.\"  He states that he believes that this began in the service as well as he remembers the other people that shared this weektelling him that he is snoring loudly.       Hip Pain: Patient has had fairly severe right-sided hip pain since being in the service.  He states that he would like to see an orthopedist for this.  He is unable to really locate where the pain is but states that it is in the groin and also laterally on his hip.  He had an MRI 6/2/18:    1.  Moderate degenerative arthritis right hip joint. There is likely grade III chondromalacia in the right hip joint and a right hip joint effusion and likely mild synovitis.  2.  Degeneration and degenerative fraying or mild degenerative tearing of the right acetabular labrum anterosuperiorly.  3.  Milder degenerative arthritis left hip joint.    He did not see an orthopedist after the MRI results.  He takes ibuprofen on occasion for the hip pain but states that he feels as though he \"probably should be taking stronger pain medication.\"    Review of Systems -  12 point review of systems completed by patient and found to be negative except for what is stated above    Medications: reviewed -   Current Outpatient Medications   Medication Sig Note     ibuprofen (ADVIL,MOTRIN) 800 MG tablet Take 800 mg by mouth daily.      triamcinolone (KENALOG) 0.1 % cream Apply topically. 10/31/2018: Received from: [employer] Received Sig: Apply  topically two times a day. Avoid use on face armpits and groin.       Past medical history: reviewed -   Past " "Medical History:   Diagnosis Date     Prediabetes        Past surgical history: reviewed - No past surgical history on file.    Family history: reviewed - No family history on file.    Social history: reviewed -   Social History     Socioeconomic History     Marital status:      Spouse name: None     Number of children: None     Years of education: None     Highest education level: None   Social Needs     Financial resource strain: None     Food insecurity - worry: None     Food insecurity - inability: None     Transportation needs - medical: None     Transportation needs - non-medical: None   Occupational History     None   Tobacco Use     Smoking status: Never Smoker     Smokeless tobacco: Never Used   Substance and Sexual Activity     Alcohol use: None     Drug use: None     Sexual activity: None   Other Topics Concern     None   Social History Narrative     None       Objective:  Vitals:    11/02/18 1610   BP: 134/82   Pulse: 84   Resp: 20   Temp: 98.1  F (36.7  C)   TempSrc: Oral   Weight: (!) 307 lb (139.3 kg)   Height: 5' 9\" (1.753 m)       Body mass index is 45.34 kg/m .    Vital signs reviewed and stable  General: No acute distress  Psych: Appropriate affect  HEENT: moist mucous membranes, pupils equal, round, reactive to light and accomodation, posterior oropharynx clear of erythema or exudate, tympanic membranes are pearly grey bilaterally  Lymph: no cervical or supraclavicular lymphadenopathy  Cardiovascular: regular rate and rhythm with no murmur  Pulmonary: clear to auscultation bilaterally with no wheeze  Abdomen: soft, non tender, non distended with normo-active bowel sounds  Extremities: warm and well perfused with no edema  Skin: warm and dry with no rash  Musculoskeletal: Focused examination of the right hip shows discomfort with both flexion and extension as well as rotation in the groin area.  He does have minimal tenderness palpation over the lateral aspect of the hip as well.        "

## 2021-06-25 NOTE — PROGRESS NOTES
Dear Dr. Augustine, [place]  [address] [address]  [address] [address]    Thank you for the opportunity to participate in the care of Mr. Anjum Oglesby.    He is a 38 y.o. male who comes to the clinic for the transfer of care of his obstructive sleep apnea.  Patient states that he was diagnosed with obstructive sleep apnea in 2007 when he was still in the .  However they have lost his records.  He is not on CPAP therapy.  As a result he continues suffer from excessive daytime sleepiness.  His wife has informed him that he has significant pauses in his breathing during sleep followed by loud snoring and he would occasionally act out violently in his dreams.  Patient's review of systems is also significant for PTSD.     Ideal Sleep-Wake Cycle(devoid of societal pressure):    Patient would try to initiate sleep at around 10-11 PM with a sleep latency of a few hours. The patient would have 3 awakening. Final wake up time is around 8:30-9 AM.    Past Medical History  Past Medical History:   Diagnosis Date     Colon polyp      Prediabetes         Past Surgical History  Past Surgical History:   Procedure Laterality Date     COLONOSCOPY          Meds  Current Outpatient Medications   Medication Sig Dispense Refill     triamcinolone (KENALOG) 0.1 % cream Apply topically.       ibuprofen (ADVIL,MOTRIN) 800 MG tablet Take 800 mg by mouth daily.       No current facility-administered medications for this visit.         Allergies  Penicillins     Social History  Social History     Socioeconomic History     Marital status:      Spouse name: Not on file     Number of children: Not on file     Years of education: Not on file     Highest education level: Not on file   Occupational History     Not on file   Social Needs     Financial resource strain: Not on file     Food insecurity:     Worry: Not on file     Inability: Not on file     Transportation needs:     Medical: Not on file     Non-medical:  Not on file   Tobacco Use     Smoking status: Never Smoker     Smokeless tobacco: Never Used   Substance and Sexual Activity     Alcohol use: Not on file     Drug use: Not on file     Sexual activity: Not on file   Lifestyle     Physical activity:     Days per week: Not on file     Minutes per session: Not on file     Stress: Not on file   Relationships     Social connections:     Talks on phone: Not on file     Gets together: Not on file     Attends Yazdanism service: Not on file     Active member of club or organization: Not on file     Attends meetings of clubs or organizations: Not on file     Relationship status: Not on file     Intimate partner violence:     Fear of current or ex partner: Not on file     Emotionally abused: Not on file     Physically abused: Not on file     Forced sexual activity: Not on file   Other Topics Concern     Not on file   Social History Narrative     Not on file        Family History  Family History   Problem Relation Age of Onset     Stroke Mother         hemorrhagic stroke     Colon cancer Father          at 53     Drug abuse Brother         heroid OD     Breast cancer Maternal Grandmother         Review of Systems:  Constitutional: Negative except as noted in HPI.   Eyes: Negative except as noted in HPI.   ENT: Negative except as noted in HPI.   Cardiovascular: Negative except as noted in HPI.   Respiratory: Negative except as noted in HPI.   Gastrointestinal: Negative except as noted in HPI.   Genitourinary: Negative except as noted in HPI.   Musculoskeletal: Negative except as noted in HPI.   Integumentary: Negative except as noted in HPI.   Neurological: Negative except as noted in HPI.   Psychiatric: Negative except as noted in HPI.   Endocrine: Negative except as noted in HPI.   Hematologic/Lymphatic: Negative except as noted in HPI.      STOP BANG 3/18/2019   Do you snore loudly (louder than talking or loud enough to be heard through closed doors)? 1   Do you often feel  "tired, fatigued, or sleepy during daytime? 1   Has anyone observed you stop breathing in your sleep? 1   Do you have or are you being treated for high blood pressure? 0   BMI more than 35 kg/m2 1   Age over 50 years old? 0   Neck circumference greater than 16 inches? 1   Gender male? 1   Total Score 6     Epworths Sleepiness Scale 3/18/2019   Sitting and reading 3   Watching TV 2   Sitting, inactive in a public place (e.g. a theatre or a meeting) 3   As a passenger in a car for an hour without a break 1   Lying down to rest in the afternoon when circumstances permit 3   Sitting and talking to someone 0   Sitting quietly after a lunch without alcohol 2   In a car, while stopped for a few minutes in traffic 0   Total score 14     Rooming 3/18/2019   Usual bedtime don't have one   Sleep Latency hours   Awakenings up to 3 times   Wake Up Time 6:50-7am   Weekends varies   Energy Drinks no   Coffee yes   Cola no   Difficulty falling asleep Yes   Difficulty staying asleep Yes   Excessive daytime tiredness Yes   Excessive daytime sleepiness Yes   Dozing off while driving No   Shift Worker Yes   Sleep Walking? Yes   Sleep Talking? No   Kicking or punching? Yes   Restless legs symptoms No       Physical Exam:  /90   Pulse 79   Ht 5' 9\" (1.753 m)   Wt (!) 307 lb (139.3 kg)   SpO2 97%   BMI 45.34 kg/m    BMI:Body mass index is 45.34 kg/m .   GEN: NAD, morbidly obese  Head: Normocephalic.  EYES: PERRLA, EOMI  ENT: Oropharynx is clear, mallampatti class 4+ airway.   Nasal mucosa is moist without erythema  Neck : Thyroid is within normal limits.  Neck circumference 21 inches  CV: Regular rate and rhythm, S1 & S2 positive.  LUNGS: Bilateral breathsounds heard.   ABDOMEN: Positive bowel sounds in all quadrants, soft, no rebound or guarding  MUSCULOSKELETAL: Bilateral trace leg swelling  SKIN: warm, dry, no rashes  Neurological: Alert, oriented to time, place, and person.  Psych: normal mood, normal affect   "   Labs/Studies:     Lab Results   Component Value Date    WBC 14.0 (H) 04/21/2018    HGB 16.1 04/21/2018    HCT 46.3 04/21/2018    MCV 86 04/21/2018     04/21/2018         Chemistry        Component Value Date/Time     04/21/2018 1158    K 4.0 04/21/2018 1158     04/21/2018 1158    CO2 24 04/21/2018 1158    BUN 20 04/21/2018 1158    CREATININE 0.96 04/21/2018 1158     04/21/2018 1158        Component Value Date/Time    CALCIUM 8.9 04/21/2018 1158    ALKPHOS 72 04/21/2018 1158    AST 22 04/21/2018 1158    ALT 24 04/21/2018 1158    BILITOT 1.7 (H) 04/21/2018 1158            No results found for: FERRITIN  No results found for: TSH      Assessment and Plan:  In summary Anjum Oglesby is a 38 y.o. year old male here for transfer of care.  1.  Suspected sleep apnea   Mr. Anjum Oglesby has high risk for obstructive sleep apnea based on the history of witnessed apnea, snoring and a crowded airway. I educated the patient on the underlying pathophysiology of obstructive sleep apnea. I recommend getting a Home sleep study.  The patient would like to think about his options before making a final decision.  He may proceed with getting a sleep study from the [facility] compared to here.  If he changes his mind he will give us a call and I would be happy to put in a home apnea test.  2.  Hypersomnia  3.  Snoring  4.  Morbid obesity    Patient verbalized understanding of these issues, agrees with the plan and all questions were answered today. Patient was given an opportuntity to voice any other symptoms or concerns not listed above. Patient did not have any other symptoms or concerns.      Patient told to return in one week after the sleep study is interpreted.      Rosendo Floyd DO  Board Certified in Internal Medicine and Sleep Medicine  [facility].    (Note created with Dragon voice recognition and unintended spelling errors and word substitutions may occur)

## 2021-07-03 NOTE — ADDENDUM NOTE
Addendum Note by Erik Victoria CNP at 2/3/2020  9:59 AM     Author: Erik Victoria CNP Service: -- Author Type: Nurse Practitioner    Filed: 2/3/2020  9:59 AM Encounter Date: 1/22/2020 Status: Signed    : Erik Victoria CNP (Nurse Practitioner)    Addended by: ERIK VICTORIA on: 2/3/2020 09:59 AM        Modules accepted: Orders

## 2021-10-10 ENCOUNTER — HEALTH MAINTENANCE LETTER (OUTPATIENT)
Age: 41
End: 2021-10-10

## 2022-05-22 ENCOUNTER — HEALTH MAINTENANCE LETTER (OUTPATIENT)
Age: 42
End: 2022-05-22

## 2022-09-18 ENCOUNTER — HEALTH MAINTENANCE LETTER (OUTPATIENT)
Age: 42
End: 2022-09-18

## 2023-06-04 ENCOUNTER — HEALTH MAINTENANCE LETTER (OUTPATIENT)
Age: 43
End: 2023-06-04

## 2024-07-03 ENCOUNTER — TRANSFERRED RECORDS (OUTPATIENT)
Dept: HEALTH INFORMATION MANAGEMENT | Facility: CLINIC | Age: 44
End: 2024-07-03

## 2024-07-08 ENCOUNTER — TRANSFERRED RECORDS (OUTPATIENT)
Dept: HEALTH INFORMATION MANAGEMENT | Facility: CLINIC | Age: 44
End: 2024-07-08

## 2024-07-19 ENCOUNTER — TRANSFERRED RECORDS (OUTPATIENT)
Dept: HEALTH INFORMATION MANAGEMENT | Facility: CLINIC | Age: 44
End: 2024-07-19